# Patient Record
(demographics unavailable — no encounter records)

---

## 2025-06-17 NOTE — HISTORY OF PRESENT ILLNESS
[FreeTextEntry1] : Jalyn is a 70 year old with recurrent UTI every 6 months. This happened with sex and foreskin gets red. The most recent was a month ago, he went to urgent care and got antibiotics. His urine was smelly and having burning.  mild LUTS IPSS 12. No Incontinence. No Hx of Kidney stones. No Hematuria. Regular bowel movement. No history of CVS or back issues. No Immune system disorders. Had Bilateral OIH surgery 2023. Other risk factors: - Social: in stable relationship with one lady for past 4 years, none smoker - FH None for PCa PVR 342ml

## 2025-06-17 NOTE — ASSESSMENT
[FreeTextEntry1] : Recurrent UTI for renal US and cysto Discussed strategies to prevent episodes of cystitis, including: -drinking at least 60 oz/day of fluids -timed voiding every 3-4h -cranberry supplement with at least 36 PACs  Prn bactrim and RTC 4 weeks with renal US and plan for starting flomax and cysto

## 2025-07-13 NOTE — ASSESSMENT
[FreeTextEntry1] : 1- ED started on tadalafil last visit 2- LUTS discussed medications like flomax and MIST procedures, patient wants to avoid any treatments at this ragini and wants to use natural remedies, he was warned about UTIs getting worse and getting into retention, and he is aware of the need to follow closely. RTC 6 months or ssoner if symptoms worsen.

## 2025-07-13 NOTE — HISTORY OF PRESENT ILLNESS
[FreeTextEntry1] : Jalyn is a 70 year old with recurrent UTI every 6 months. This happened with sex and foreskin gets red. The most recent was a month ago, he went to urgent care and got antibiotics. His urine was smelly and having burning. mild LUTS IPSS 12. No Incontinence. No Hx of Kidney stones. No Hematuria. Regular bowel movement. No history of CVS or back issues. No Immune system disorders. Had Bilateral OIH surgery 2023. Other risk factors: - Social: in stable relationship with one lady for past 4 years, none smoker - FH None for PCa PVR 342ml  7/11/2025  LUTS IPSS 10 QOL 1 PVR 200ml patient doesn't want to be use medications. He however wants to take medication for erectile dysfunction. He had recurrent UTI likely due to outlet obstruction, but he wanted to use natural remedies. Renal US today (see separate note): Bladder: Post void residual 196 cc Bladder wall: 1.1 mm  Findings: Right kidney sub centimeter nonvascular simple cyst. Left kidney appears unremarkable. Bilateral bladder diverticulum measuring 3.9 x 2.5 cm having an oval shape in communication with main bladder lumen. Both kidneys are normal in size and echogenicity without hydronephrosis, stones or solid masses present.